# Patient Record
Sex: FEMALE | Race: WHITE | URBAN - METROPOLITAN AREA
[De-identification: names, ages, dates, MRNs, and addresses within clinical notes are randomized per-mention and may not be internally consistent; named-entity substitution may affect disease eponyms.]

---

## 2017-06-12 ENCOUNTER — OFFICE VISIT (OUTPATIENT)
Dept: FAMILY MEDICINE | Facility: CLINIC | Age: 42
End: 2017-06-12
Payer: COMMERCIAL

## 2017-06-12 VITALS
TEMPERATURE: 99 F | DIASTOLIC BLOOD PRESSURE: 79 MMHG | HEIGHT: 67 IN | OXYGEN SATURATION: 98 % | WEIGHT: 180.2 LBS | HEART RATE: 68 BPM | BODY MASS INDEX: 28.28 KG/M2 | SYSTOLIC BLOOD PRESSURE: 134 MMHG

## 2017-06-12 DIAGNOSIS — S93.602A FOOT SPRAIN, LEFT, INITIAL ENCOUNTER: Primary | ICD-10-CM

## 2017-06-12 PROCEDURE — 99203 OFFICE O/P NEW LOW 30 MIN: CPT | Performed by: PHYSICIAN ASSISTANT

## 2017-06-12 RX ORDER — DICLOFENAC SODIUM 75 MG/1
75 TABLET, DELAYED RELEASE ORAL 2 TIMES DAILY
Qty: 14 TABLET | Refills: 0 | Status: SHIPPED | OUTPATIENT
Start: 2017-06-12 | End: 2017-06-20 | Stop reason: ALTCHOICE

## 2017-06-12 NOTE — MR AVS SNAPSHOT
After Visit Summary   6/12/2017    Randi Gregory    MRN: [id number]           Patient Information     Date Of Birth          1975        Visit Information        Provider Department      6/12/2017 10:20 AM Nati Bañuelos PA-C [facility]        Today's Diagnoses     Foot sprain, left, initial encounter    -  1      Care Instructions    FYI: NATI WILL BE OUT OF THE CLINIC FROM AKILAH 15 THROUGH JULY 4.  Any calls/Mychart messages, refill requests, and urgent lab results will be forwarded to her colleagues in her absence.     You can combine Tylenol with diclofenac if you need something extra for pain.      Foot Sprain    A sprain is a stretching or tearing of the ligaments that hold a joint together. There are no broken bones. Sprains generally take from 3-6 weeks to heal. A sprain may be treated with a splint, walking cast, or special boot. Mild sprains may not need any additional support.  Home care  The following guidelines will help you care for your injury at home:    Keep your leg elevated when sitting or lying down. This is very important during the first 48 hours to reduce swelling. Stay off the injured foot as much as possible until you can walk on it without pain. If needed, you may use crutches during the first week for this purpose. Crutches can be rented at many pharmacies or surgical/orthopedic supply stores.    You may be given a cast shoe to wear to prevent movement in your foot. If not, you can use a sandal or any shoe that does not put pressure on the injured area until the swelling and pain go away. If using a sandal, be careful not to hit your foot against anything, since another injury could make the sprain worse.    Apply an ice pack over the injured area for 15 to 20 minutes every 3 to 6 hours. You should do this for the first 24 to 48 hours. You can make an ice pack by filling a plastic bag that seals at the top with ice cubes and then wrapping it with a  thin towel. Continue to use ice packs for relief of pain and swelling as needed. As the ice melts, avoid getting any wrap, splint, or cast wet. After 48 hours, apply heat from a warm shower or bath for 20 minutes several times daily. Alternating ice and heat may also be helpful.    You may use over-the-counter pain medicine to control pain, unless another medicine was prescribed. If you have chronic liver or kidney disease or ever had a stomach ulcer or GI bleeding, talk with your healthcare provider before using these medicines.    If you were given a splint or cast, keep it dry. Bathe with your splint or cast well out of the water, protected with 2 large plastic bags, rubber-banded at the top end. If a fiberglass splint or cast gets wet, you can dry it with a hair dryer.    You may return to sports after healing, when you can run without pain.  Follow-up care  Follow up with your healthcare provider as directed. Sometimes fractures don t show up on the first X-ray. Bruises and sprains can sometimes hurt as much as a fracture. These injuries can take time to heal completely. If your symptoms don t improve or they get worse, talk with your healthcare provider. You may need a repeat X-ray.  When to seek medical advice  Call your healthcare provider right away if any of these occur:    The plaster cast or splint gets wet or soft    The fiberglass cast or splint gets wet and does not dry for 24 hours    Pain or swelling increases, or redness appears    A bad odor comes from within the cast    Fever of 100.4 F (38 C) or above lasting for 24 to 48 hours    Toes on the injured foot become cold, blue, numb, or tingly    7128-0616 [employer]. [address] [place] [address]. All rights reserved. This information is not intended as a substitute for professional medical care. Always follow your healthcare professional's instructions.                Follow-ups after your visit        Who to contact      "Normal or non-critical lab and imaging results will be communicated to you by MyChart, letter or phone within 4 business days after the clinic has received the results. If you do not hear from us within 7 days, please contact the clinic through [employer] or phone. If you have a critical or abnormal lab result, we will notify you by phone as soon as possible.  Submit refill requests through [employer] or call your pharmacy and they will forward the refill request to us. Please allow 3 business days for your refill to be completed.          If you need to speak with a  for additional information , please call: [phone]             Additional Information About Your Visit        [employer] Information     [employer] lets you send messages to your doctor, view your test results, renew your prescriptions, schedule appointments and more. To sign up, go to [email or web address] . Click on \"Log in\" on the left side of the screen, which will take you to the Welcome page. Then click on \"Sign up Now\" on the right side of the page.     You will be asked to enter the access code listed below, as well as some personal information. Please follow the directions to create your username and password.     Your access code is: [email or web address]  Expires: 9/10/2017  2:26 PM     Your access code will  in 90 days. If you need help or a new code, please call your [facility] clinic or [phone].        Care EveryWhere ID     This is your Care EveryWhere ID. This could be used by other organizations to access your [facility] medical records  [id number]        Your Vitals Were     Pulse Temperature Height Pulse Oximetry BMI (Body Mass Index)       68 99  F (37.2  C) (Oral) 5' 7\" (1.702 m) 98% 28.22 kg/m2        Blood Pressure from Last 3 Encounters:   17 134/79    Weight from Last 3 Encounters:   17 180 lb 3.2 oz (81.7 kg)              Today, you had the following     No orders found for display         Today's " Medication Changes          These changes are accurate as of: 6/12/17  2:26 PM.  If you have any questions, ask your nurse or doctor.               Start taking these medicines.        Dose/Directions    diclofenac 75 MG EC tablet   Commonly known as:  VOLTAREN   Used for:  Foot sprain, left, initial encounter   Started by:  Radha Bañuelos PA-C        Dose:  75 mg   Take 1 tablet (75 mg) by mouth 2 times daily for 7 days   Quantity:  14 tablet   Refills:  0            Where to get your medicines      These medications were sent to [id number] [place] - [address]  [address] [place] [address]     Phone:  [phone]     diclofenac 75 MG EC tablet                Primary Care Provider Office Phone #    Damon [facility]ins [facility] [phone]       No address on file        Thank you!     Thank you for choosing [facility] your care. Our goal is always to provide you with excellent care. Hearing back from our patients is one way we can continue to improve our services. Please take a few minutes to complete the written survey that you may receive in the mail after your visit with us. Thank you!             Your Updated Medication List - Protect others around you: Learn how to safely use, store and throw away your medicines at www.disposemymeds.org.          This list is accurate as of: 6/12/17  2:26 PM.  Always use your most recent med list.                   Brand Name Dispense Instructions for use    diclofenac 75 MG EC tablet    VOLTAREN    14 tablet    Take 1 tablet (75 mg) by mouth 2 times daily for 7 days

## 2017-06-12 NOTE — PROGRESS NOTES
"  SUBJECTIVE:                                                    Randi Gregory is a 41 year old female who presents to clinic today for the following health issues:    Joint Pain     Onset: x1 day     Description:   Location: left foot - top   Character: soreness     Intensity: 10/10    Progression of Symptoms: worse    Accompanying Signs & Symptoms:  Other symptoms: none   History:   Previous similar pain: no       Precipitating factors:   Trauma or overuse: no on feet a lot not sure of anything specific     Alleviating factors:  Improved by: not walking        Therapies Tried and outcome: ibuprofen 800mg - no relief      Problem list and histories reviewed & adjusted, as indicated.  Additional history: as documented    There is no problem list on file for this patient.    History reviewed. No pertinent surgical history.    Social History   Substance Use Topics     Smoking status: Never Smoker     Smokeless tobacco: Never Used     Alcohol use No     History reviewed. No pertinent family history.        Reviewed and updated as needed this visit by clinical staff  Tobacco  Allergies  Meds  Med Hx  Surg Hx  Fam Hx  Soc Hx      Reviewed and updated as needed this visit by Provider         ROS:  Constitutional, neuro, musculoskeletal, integument systems are negative, except as otherwise noted.    OBJECTIVE:                                                    /79  Pulse 68  Temp 99  F (37.2  C) (Oral)  Ht 5' 7\" (1.702 m)  Wt 180 lb 3.2 oz (81.7 kg)  SpO2 98%  BMI 28.22 kg/m2  Body mass index is 28.22 kg/(m^2).  GENERAL APPEARANCE: healthy, alert and no distress  MS: extremities normal- no gross deformities noted  ORTHO: Foot Exam: Inspection:  no swelling   Tender::2nd, 3rd metatarsal  Range of Motion:flexion of toes:  full, extension of toes  full    NEURO: Normal strength and tone and sensory exam grossly normal       ASSESSMENT:                                                      1. Foot sprain, " left, initial encounter         PLAN:                                                    Diclofenac BID x7 days. Work restrictions given, limit walking. Supportive therapy also discussed. Follow up if symptoms fail to improve or worsen.      Patient Instructions   FYI: NATI WILL BE OUT OF THE CLINIC FROM AKILAH 15 THROUGH JULY 4.  Any calls/Mychart messages, refill requests, and urgent lab results will be forwarded to her colleagues in her absence.     You can combine Tylenol with diclofenac if you need something extra for pain.      Foot Sprain    A sprain is a stretching or tearing of the ligaments that hold a joint together. There are no broken bones. Sprains generally take from 3-6 weeks to heal. A sprain may be treated with a splint, walking cast, or special boot. Mild sprains may not need any additional support.  Home care  The following guidelines will help you care for your injury at home:    Keep your leg elevated when sitting or lying down. This is very important during the first 48 hours to reduce swelling. Stay off the injured foot as much as possible until you can walk on it without pain. If needed, you may use crutches during the first week for this purpose. Crutches can be rented at many pharmacies or surgical/orthopedic supply stores.    You may be given a cast shoe to wear to prevent movement in your foot. If not, you can use a sandal or any shoe that does not put pressure on the injured area until the swelling and pain go away. If using a sandal, be careful not to hit your foot against anything, since another injury could make the sprain worse.    Apply an ice pack over the injured area for 15 to 20 minutes every 3 to 6 hours. You should do this for the first 24 to 48 hours. You can make an ice pack by filling a plastic bag that seals at the top with ice cubes and then wrapping it with a thin towel. Continue to use ice packs for relief of pain and swelling as needed. As the ice melts, avoid getting  any wrap, splint, or cast wet. After 48 hours, apply heat from a warm shower or bath for 20 minutes several times daily. Alternating ice and heat may also be helpful.    You may use over-the-counter pain medicine to control pain, unless another medicine was prescribed. If you have chronic liver or kidney disease or ever had a stomach ulcer or GI bleeding, talk with your healthcare provider before using these medicines.    If you were given a splint or cast, keep it dry. Bathe with your splint or cast well out of the water, protected with 2 large plastic bags, rubber-banded at the top end. If a fiberglass splint or cast gets wet, you can dry it with a hair dryer.    You may return to sports after healing, when you can run without pain.  Follow-up care  Follow up with your healthcare provider as directed. Sometimes fractures don t show up on the first X-ray. Bruises and sprains can sometimes hurt as much as a fracture. These injuries can take time to heal completely. If your symptoms don t improve or they get worse, talk with your healthcare provider. You may need a repeat X-ray.  When to seek medical advice  Call your healthcare provider right away if any of these occur:    The plaster cast or splint gets wet or soft    The fiberglass cast or splint gets wet and does not dry for 24 hours    Pain or swelling increases, or redness appears    A bad odor comes from within the cast    Fever of 100.4 F (38 C) or above lasting for 24 to 48 hours    Toes on the injured foot become cold, blue, numb, or tingly    0267-1880 The "[employer] [address] [place] [address], [place] [address]. All rights reserved. This information is not intended as a substitute for professional medical care. Always follow your healthcare professional's instructions.             Radha Bañuelos PA-C  [facility]

## 2017-06-12 NOTE — NURSING NOTE
"Chief Complaint   Patient presents with     Foot Problems       Initial /79  Pulse 68  Temp 99  F (37.2  C) (Oral)  Ht 5' 7\" (1.702 m)  Wt 180 lb 3.2 oz (81.7 kg)  SpO2 98%  BMI 28.22 kg/m2 Estimated body mass index is 28.22 kg/(m^2) as calculated from the following:    Height as of this encounter: 5' 7\" (1.702 m).    Weight as of this encounter: 180 lb 3.2 oz (81.7 kg).  Medication Reconciliation: complete   Lizbeth Beasley MA      "

## 2017-06-12 NOTE — PATIENT INSTRUCTIONS
KOREYI: NATI WILL BE OUT OF THE CLINIC FROM AKILAH 15 THROUGH JULY 4.  Any calls/Mychart messages, refill requests, and urgent lab results will be forwarded to her colleagues in her absence.     You can combine Tylenol with diclofenac if you need something extra for pain.      Foot Sprain    A sprain is a stretching or tearing of the ligaments that hold a joint together. There are no broken bones. Sprains generally take from 3-6 weeks to heal. A sprain may be treated with a splint, walking cast, or special boot. Mild sprains may not need any additional support.  Home care  The following guidelines will help you care for your injury at home:    Keep your leg elevated when sitting or lying down. This is very important during the first 48 hours to reduce swelling. Stay off the injured foot as much as possible until you can walk on it without pain. If needed, you may use crutches during the first week for this purpose. Crutches can be rented at many pharmacies or surgical/orthopedic supply stores.    You may be given a cast shoe to wear to prevent movement in your foot. If not, you can use a sandal or any shoe that does not put pressure on the injured area until the swelling and pain go away. If using a sandal, be careful not to hit your foot against anything, since another injury could make the sprain worse.    Apply an ice pack over the injured area for 15 to 20 minutes every 3 to 6 hours. You should do this for the first 24 to 48 hours. You can make an ice pack by filling a plastic bag that seals at the top with ice cubes and then wrapping it with a thin towel. Continue to use ice packs for relief of pain and swelling as needed. As the ice melts, avoid getting any wrap, splint, or cast wet. After 48 hours, apply heat from a warm shower or bath for 20 minutes several times daily. Alternating ice and heat may also be helpful.    You may use over-the-counter pain medicine to control pain, unless another medicine was  prescribed. If you have chronic liver or kidney disease or ever had a stomach ulcer or GI bleeding, talk with your healthcare provider before using these medicines.    If you were given a splint or cast, keep it dry. Bathe with your splint or cast well out of the water, protected with 2 large plastic bags, rubber-banded at the top end. If a fiberglass splint or cast gets wet, you can dry it with a hair dryer.    You may return to sports after healing, when you can run without pain.  Follow-up care  Follow up with your healthcare provider as directed. Sometimes fractures don t show up on the first X-ray. Bruises and sprains can sometimes hurt as much as a fracture. These injuries can take time to heal completely. If your symptoms don t improve or they get worse, talk with your healthcare provider. You may need a repeat X-ray.  When to seek medical advice  Call your healthcare provider right away if any of these occur:    The plaster cast or splint gets wet or soft    The fiberglass cast or splint gets wet and does not dry for 24 hours    Pain or swelling increases, or redness appears    A bad odor comes from within the cast    Fever of 100.4 F (38 C) or above lasting for 24 to 48 hours    Toes on the injured foot become cold, blue, numb, or tingly    8349-8139 [employer]. [address] [place] [address], [place] [address]. All rights reserved. This information is not intended as a substitute for professional medical care. Always follow your healthcare professional's instructions.

## 2017-06-20 ENCOUNTER — RADIANT APPOINTMENT (OUTPATIENT)
Dept: GENERAL RADIOLOGY | Facility: CLINIC | Age: 42
End: 2017-06-20
Attending: FAMILY MEDICINE
Payer: COMMERCIAL

## 2017-06-20 ENCOUNTER — OFFICE VISIT (OUTPATIENT)
Dept: FAMILY MEDICINE | Facility: CLINIC | Age: 42
End: 2017-06-20
Payer: COMMERCIAL

## 2017-06-20 VITALS
HEART RATE: 74 BPM | DIASTOLIC BLOOD PRESSURE: 75 MMHG | SYSTOLIC BLOOD PRESSURE: 120 MMHG | BODY MASS INDEX: 28.66 KG/M2 | HEIGHT: 67 IN | WEIGHT: 182.6 LBS | OXYGEN SATURATION: 98 % | TEMPERATURE: 98.5 F

## 2017-06-20 DIAGNOSIS — R03.0 ELEVATED BLOOD PRESSURE READING WITHOUT DIAGNOSIS OF HYPERTENSION: ICD-10-CM

## 2017-06-20 DIAGNOSIS — M79.672 LEFT FOOT PAIN: Primary | ICD-10-CM

## 2017-06-20 PROCEDURE — 73630 X-RAY EXAM OF FOOT: CPT | Mod: LT

## 2017-06-20 PROCEDURE — 99213 OFFICE O/P EST LOW 20 MIN: CPT | Performed by: FAMILY MEDICINE

## 2017-06-20 RX ORDER — ETODOLAC 300 MG
300 CAPSULE ORAL
Qty: 30 CAPSULE | Refills: 0 | Status: SHIPPED | OUTPATIENT
Start: 2017-06-20

## 2017-06-20 NOTE — NURSING NOTE
"Chief Complaint   Patient presents with     Musculoskeletal Problem       Initial /81  Pulse 74  Temp 98.5  F (36.9  C) (Tympanic)  Ht 5' 7\" (1.702 m)  Wt 182 lb 9.6 oz (82.8 kg)  SpO2 98%  Breastfeeding? No  BMI 28.6 kg/m2 Estimated body mass index is 28.6 kg/(m^2) as calculated from the following:    Height as of this encounter: 5' 7\" (1.702 m).    Weight as of this encounter: 182 lb 9.6 oz (82.8 kg).  Medication Reconciliation: complete       Marta Xiao MA      "

## 2017-06-20 NOTE — PATIENT INSTRUCTIONS
Will notify you with results  R.I.C.E.    R.I.C.E. stands for Rest, Ice, Compression, and Elevation. Doing these things helps limit pain and swelling after an injury. R.I.C.E. also helps injuries heal faster. Use R.I.C.E. for sprains, strains, and severe bruises or bumps. Follow the tips on this handout and begin R.I.C.E. as soon as possible after an injury.  ? Rest  Pain is your body s way of telling you to rest an injured area. Whether you have hurt an elbow, hand, foot, or knee, limiting its use will prevent further injury and help you heal.  ? Ice  Applying ice right after an injury helps prevent swelling and reduce pain. Don t place ice directly on your skin.    Wrap a cold pack or bag of ice in a thin cloth. Place it over the injured area.    Ice for 10 minutes every 3 hours. Don t ice for more than 20 minutes at a time.  ? Compression  Putting pressure (compression) on an injury helps prevent swelling and provides support.    Wrap the injured area firmly with an elastic bandage. If your hand or foot tingles, becomes discolored, or feels cold to the touch, the bandage may be too tight. Rewrap it more loosely.    If your bandage becomes too loose, rewrap it.    Do not wear an elastic bandage overnight.  ? Elevation  Keeping an injury elevated helps reduce swelling, pain, and throbbing. Elevation is most effective when the injury is kept elevated higher than the heart.     Call your healthcare provider if you notice any of the following:    Fingers or toes feel numb, are cold to the touch, or change color    Skin looks shiny or tight    Pain, swelling, or bruising worsens and is not improved with elevation   Date Last Reviewed: 9/3/2015    5413-4589 [employer]. [address] [place] [address], [place] [address]. All rights reserved. This information is not intended as a substitute for professional medical care. Always follow your healthcare professional's instructions.

## 2017-06-20 NOTE — MR AVS SNAPSHOT
"              After Visit Summary   6/20/2017    Randi Gregory    MRN: [id number]           Patient Information     Date Of Birth          1975        Visit Information        Provider Department      6/20/2017 3:30 PM Sarai Alexander MD [facility] [place]        Today's Diagnoses     Left foot pain    -  1    Elevated blood pressure reading without diagnosis of hypertension          Care Instructions    Will notify you with results          Follow-ups after your visit        Follow-up notes from your care team     Return if symptoms worsen or fail to improve.      Your next 10 appointments already scheduled     Jun 28, 2017  9:30 AM CDT   PHYSICAL with Sarai Alexander MD   [facility] [place] ([facility])    [address]  [place] [address] [phone]              Who to contact     Normal or non-critical lab and imaging results will be communicated to you by MyChart, letter or phone within 4 business days after the clinic has received the results. If you do not hear from us within 7 days, please contact the clinic through MyChart or phone. If you have a critical or abnormal lab result, we will notify you by phone as soon as possible.  Submit refill requests through [employer] or call your pharmacy and they will forward the refill request to us. Please allow 3 business days for your refill to be completed.          If you need to speak with a  for additional information , please call: [phone]             Additional Information About Your Visit        GrownOut Information     [employer] lets you send messages to your doctor, view your test results, renew your prescriptions, schedule appointments and more. To sign up, go to [email or web address] . Click on \"Log in\" on the left side of the screen, which will take you to the Welcome page. Then click on \"Sign up Now\" on the right side of the page.     You will be asked to enter the access code " "listed below, as well as some personal information. Please follow the directions to create your username and password.     Your access code is: [email or web address]  Expires: 9/10/2017  2:26 PM     Your access code will  in 90 days. If you need help or a new code, please call your [facility] or [phone].        Care EveryWhere ID     This is your Care EveryWhere ID. This could be used by other organizations to access your [place] medical records  [id number]        Your Vitals Were     Pulse Temperature Height Pulse Oximetry Breastfeeding? BMI (Body Mass Index)    74 98.5  F (36.9  C) (Tympanic) 5' 7\" (1.702 m) 98% No 28.6 kg/m2       Blood Pressure from Last 3 Encounters:   17 143/81   17 134/79    Weight from Last 3 Encounters:   17 182 lb 9.6 oz (82.8 kg)   17 180 lb 3.2 oz (81.7 kg)              We Performed the Following     XR Foot Left G/E 3 Views          Today's Medication Changes          These changes are accurate as of: 17  4:04 PM.  If you have any questions, ask your nurse or doctor.               Start taking these medicines.        Dose/Directions    etodolac 300 MG capsule   Commonly known as:  LODINE   Used for:  Left foot pain   Started by:  Sarai Alexander MD        Dose:  300 mg   Take 1 capsule (300 mg) by mouth 3 times daily (with meals)   Quantity:  30 capsule   Refills:  0         Stop taking these medicines if you haven't already. Please contact your care team if you have questions.     diclofenac 75 MG EC tablet   Commonly known as:  VOLTAREN   Stopped by:  Sarai Alexander MD                Where to get your medicines      These medications were sent to [id number] [place] - [address]  [address] [place] [address]     Phone:  [phone]     etodolac 300 MG capsule                Primary Care Provider Office Phone #    [facility] [phone]       No address on file        Thank you!     Thank you for " [facility]  [facility] your [facility]. Our goal is always to provide you with excellent care. Hearing back from our patients is one way we can continue to improve our services. Please take a few minutes to complete the written survey that you may receive in the mail after your visit with us. Thank you!             Your Updated Medication List - Protect others around you: Learn how to safely use, store and throw away your medicines at www.disposemymeds.org.          This list is accurate as of: 6/20/17  4:04 PM.  Always use your most recent med list.                   Brand Name Dispense Instructions for use    etodolac 300 MG capsule    LODINE    30 capsule    Take 1 capsule (300 mg) by mouth 3 times daily (with meals)

## 2017-06-20 NOTE — PROGRESS NOTES
"  SUBJECTIVE:                                                    Randi Gregory is a 41 year old female who presents to clinic today for the following health issues:      Foot Pain- Left/ Last OV 6/12/17  Completed Voltaren as directed, no improvements of symptoms.   Has 2 part time jobs, she is on her feet the entire shift.  May need to discuss restrictions at work- discuss letter for work.     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 Pain is located on the anterior aspect of the foot, rates it as 10/10, not relieved with the BID Voltaren.   Denies any recent direct injury/trauma to the foot.   No previous known history of stress fracture.    Problem list and histories reviewed & adjusted, as indicated.  Additional history: as documented    Current Outpatient Prescriptions   Medication Sig Dispense Refill     etodolac (LODINE) 300 MG capsule Take 1 capsule (300 mg) by mouth 3 times daily (with meals) 30 capsule 0     No Known Allergies    Reviewed and updated as needed this visit by clinical staff       Reviewed and updated as needed this visit by Provider         ROS:  Constitutional, HEENT, cardiovascular, pulmonary, gi and gu systems are negative, except as otherwise noted.    OBJECTIVE:                                                    /75  Pulse 74  Temp 98.5  F (36.9  C) (Tympanic)  Ht 5' 7\" (1.702 m)  Wt 182 lb 9.6 oz (82.8 kg)  SpO2 98%  Breastfeeding? No  BMI 28.6 kg/m2  Body mass index is 28.6 kg/(m^2).  GENERAL: healthy, alert and no distress  LEFT FOOT: Normal appearing. No cyanosis. Pedal pulse present. Tenderness over the anterior aspect of the foot but no overlying skin changes.  GAIT: cautious gait.     Diagnostic Test Results:  Xray - in process; will notify patient with results.      ASSESSMENT/PLAN:                                                    Randi was seen today for musculoskeletal problem.    Diagnoses and all orders for this visit:    Left foot " pain  -     XR Foot Left G/E 3 Views  -    Discontinue Voltaren. Try: etodolac (LODINE) 300 MG capsule; Take 1 capsule (300 mg) by mouth 3 times daily (with meals)  In the interim; recommended R.I.C.E therapy; shoe inserts and good sturdy shoes.     Elevated blood pressure reading without diagnosis of hypertension  Repeat BP at the end of the visit was within goal.    Will notify patient with X-ray results     Patient education and Handout with home care instructions given.       Follow up if symptoms fail to improve or worsen.      The patient was in agreement with the plan today and had no questions or concerns prior to leaving the clinic.      Sarai Alexander MD  [facility]

## 2017-06-22 ENCOUNTER — TELEPHONE (OUTPATIENT)
Dept: FAMILY MEDICINE | Facility: CLINIC | Age: 42
End: 2017-06-22

## 2017-06-22 NOTE — LETTER
June 22, 2017      Randi Gregory  [address]  [place] [address]              TO WHOM IT MAY CONCERN      Patient was seen in the clinic on 6/20/2017.       Sincerely,        Sarai Alexander M.D    [facility]

## 2017-06-23 ENCOUNTER — OFFICE VISIT (OUTPATIENT)
Dept: ORTHOPEDICS | Facility: CLINIC | Age: 42
End: 2017-06-23
Payer: COMMERCIAL

## 2017-06-23 VITALS
BODY MASS INDEX: 28.56 KG/M2 | HEIGHT: 67 IN | SYSTOLIC BLOOD PRESSURE: 112 MMHG | DIASTOLIC BLOOD PRESSURE: 70 MMHG | WEIGHT: 182 LBS

## 2017-06-23 DIAGNOSIS — M77.42 METATARSALGIA OF LEFT FOOT: Primary | ICD-10-CM

## 2017-06-23 DIAGNOSIS — S99.922D FOOT INJURY, LEFT, SUBSEQUENT ENCOUNTER: ICD-10-CM

## 2017-06-23 PROCEDURE — 99203 OFFICE O/P NEW LOW 30 MIN: CPT | Performed by: FAMILY MEDICINE

## 2017-06-23 NOTE — NURSING NOTE
"Chief Complaint   Patient presents with     Foot Pain     left       Initial /70  Ht 5' 7\" (1.702 m)  Wt 182 lb (82.6 kg)  BMI 28.51 kg/m2 Estimated body mass index is 28.51 kg/(m^2) as calculated from the following:    Height as of this encounter: 5' 7\" (1.702 m).    Weight as of this encounter: 182 lb (82.6 kg).  Medication Reconciliation: complete    "

## 2017-06-23 NOTE — PROGRESS NOTES
"Randi Gregory  :  1975  DOS: 2017  MRN: [id number]    Sports Medicine Clinic Visit    PCP: Damon Montero    Randi Gregory is a 41 year old female who is seen as an AIC patient presenting with left superior foot pain localized to the 2nd and 3rd metatarsals    Injury: Was walking in the store and felt pain beginning 1 week(s) ago.  Pain located over left 2nd and 3rd metatrasal, nonradiating.  Additional Features:  Positive: swelling, walks with antalgic gait.  Symptoms are better with Other medications: etodolac, Rest and Elevation.  Symptoms are worse with: walking.  Other evaluation and/or treatments so far consists of: Ice, Other medications: etodolac, Elevation and epsom salt bath.  Prior imaging: X-rays completed 17.   Prior History of related problems:none    Social History: [employer] , customer service    Review of Systems  Musculoskeletal: as above  Remainder of review of systems is negative including constitutional, CV, pulmonary, GI, Skin and Neurologic except as noted in HPI or medical history.    No past medical history on file.  No past surgical history on file.  Objective  /70  Ht 5' 7\" (1.702 m)  Wt 182 lb (82.6 kg)  BMI 28.51 kg/m2    General: healthy, alert and in no distress    HEENT: no scleral icterus or conjunctival erythema   Skin: no suspicious lesions or rash. No jaundice.   CV: regular rhythm by palpation, 2+ distal pulses, no pedal edema    Resp: normal respiratory effort without conversational dyspnea   Psych: normal mood and affect    Gait: mildly antalgic, appropriate coordination and balance   Neuro: normal light touch sensory exam of the extremities. Motor strength as noted below     Left Ankle/Foot Exam:    Inspection:       no visible ecchymosis       no visible edema or effusion    Foot inspection:       no deformity noted    ROM:        full ROM with dorsiflexion, plantarflexion, inversion and eversion    Tender:       proximal " "1st and 2nd intermetatarsal ligament       2nd and 3rd MT    Non-Tender:       remainder of foot and ankle       lateral malleolus       lateral ankle ligaments       deltoid ligament       posterior edge of lateral malleolus       proximal 5th metatarsal       dorsal tibiotalar joint       tarsal navicular       medial malleolus       distal tibiofibular joint       tibialis posterior tendon, posterior to medial malleolus       peroneal tendon sheath    Skin:       well perfused       capillary refill less than 2 seconds    Special Tests:       neg (-) talar tilt left    Gait:       increased pain with attempt at rising on toes    Proprioception:        deferred      Radiology:  Recent Results (from the past 744 hour(s))   XR Foot Left G/E 3 Views    Narrative    XR FOOT LT G/E 3 VW 6/20/2017 4:21 PM    HISTORY: Left foot pain.    COMPARISON: None.    FINDINGS: No fracture or malalignment. No significant degenerative  change. Osseous structures are within normal limits.      Impression    IMPRESSION: No specific finding to explain pain.    STEFAN GARCÍA MD         Assessment:  1. Metatarsalgia of left foot    2. Foot injury, left, subsequent encounter        Plan:  Discussed the assessment with the patient.  Follow up: 2 weeks, prn  Overuse type injury suspected  Able to bear weight with less pain in dynaflex insert, but much better in walking boot  Will treat for possible stress injury though no acute findings on XR  XR images independently visualized and reviewed with patient today in clinic  Wean boot as tolerated  If painfree in 2 weeks can return to work without restriction  Letter provided today:  \"I am recommending that she strictly avoid painful activity over the next 2 weeks.  I anticipate standing and walking will worsen her pain, and so light duty desk work is most appropriate over the next 2 weeks.  She may be using a walking boot and/or cane during that time.  Please help to accommodate.  Updated " "recommendations will be provided as needed.\"  Assistive device use reviewed  Prefer tylenol to advil if stress injury  We discussed modified progressive pain-free activity as tolerated  Home handouts provided and supportive care reviewed  All questions were answered today  Contact us with additional questions or concerns  Signs and sx of concern reviewed      Mac Martin DO, CAJOHANA  Primary Care Sports Medicine  [facility]                 "

## 2017-06-23 NOTE — LETTER
June 23, 2017      Randi Gregory  [address]  [place] [address]        Randi was seen in my office today.  I am recommending that she strictly avoid painful activity over the next 2 weeks.  I anticipate standing and walking will worsen her pain, and so light duty desk work is most appropriate over the next 2 weeks.  She may be using a walking boot and/or cane during that time.  Please help to accommodate.  Updated recommendations will be provided as needed.        Mac Freire DO, CAQ  Primary Care Sports Medicine  [facility]

## 2017-06-27 ENCOUNTER — TELEPHONE (OUTPATIENT)
Dept: ORTHOPEDICS | Facility: CLINIC | Age: 42
End: 2017-06-27

## 2017-06-27 NOTE — TELEPHONE ENCOUNTER
Call made to patient in follow up to AIC Visit.  LVM for return call.      I m calling from [facility]  in follow up to your recent visit on 6/23.  Please return our call at [phone] option [phone]. If we do not hear back from you we trust you are doing well.
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
## 2017-07-05 ENCOUNTER — OFFICE VISIT (OUTPATIENT)
Dept: FAMILY MEDICINE | Facility: CLINIC | Age: 42
End: 2017-07-05
Payer: COMMERCIAL

## 2017-07-05 VITALS
SYSTOLIC BLOOD PRESSURE: 132 MMHG | BODY MASS INDEX: 28.41 KG/M2 | WEIGHT: 181 LBS | DIASTOLIC BLOOD PRESSURE: 89 MMHG | HEIGHT: 67 IN | OXYGEN SATURATION: 98 % | HEART RATE: 68 BPM | TEMPERATURE: 97.9 F

## 2017-07-05 DIAGNOSIS — Z13.1 SCREENING FOR DIABETES MELLITUS: ICD-10-CM

## 2017-07-05 DIAGNOSIS — Z12.4 SCREENING FOR CERVICAL CANCER: ICD-10-CM

## 2017-07-05 DIAGNOSIS — F41.8 DEPRESSION WITH ANXIETY: ICD-10-CM

## 2017-07-05 DIAGNOSIS — Z00.00 ROUTINE GENERAL MEDICAL EXAMINATION AT A HEALTH CARE FACILITY: Primary | ICD-10-CM

## 2017-07-05 DIAGNOSIS — Z13.220 LIPID SCREENING: ICD-10-CM

## 2017-07-05 DIAGNOSIS — Z12.31 ENCOUNTER FOR SCREENING MAMMOGRAM FOR BREAST CANCER: ICD-10-CM

## 2017-07-05 LAB
ALBUMIN SERPL-MCNC: 3.6 G/DL (ref 3.4–5)
ALP SERPL-CCNC: 106 U/L (ref 40–150)
ALT SERPL W P-5'-P-CCNC: 19 U/L (ref 0–50)
ANION GAP SERPL CALCULATED.3IONS-SCNC: 7 MMOL/L (ref 3–14)
AST SERPL W P-5'-P-CCNC: 13 U/L (ref 0–45)
BILIRUB SERPL-MCNC: 0.3 MG/DL (ref 0.2–1.3)
BUN SERPL-MCNC: 17 MG/DL (ref 7–30)
CALCIUM SERPL-MCNC: 8.9 MG/DL (ref 8.5–10.1)
CHLORIDE SERPL-SCNC: 105 MMOL/L (ref 94–109)
CHOLEST SERPL-MCNC: 182 MG/DL
CO2 SERPL-SCNC: 26 MMOL/L (ref 20–32)
CREAT SERPL-MCNC: 0.64 MG/DL (ref 0.52–1.04)
ERYTHROCYTE [DISTWIDTH] IN BLOOD BY AUTOMATED COUNT: 12.9 % (ref 10–15)
GFR SERPL CREATININE-BSD FRML MDRD: NORMAL ML/MIN/1.7M2
GLUCOSE SERPL-MCNC: 89 MG/DL (ref 70–99)
HCT VFR BLD AUTO: 39.9 % (ref 35–47)
HDLC SERPL-MCNC: 44 MG/DL
HGB BLD-MCNC: 12.5 G/DL (ref 11.7–15.7)
LDLC SERPL CALC-MCNC: 124 MG/DL
MCH RBC QN AUTO: 29.7 PG (ref 26.5–33)
MCHC RBC AUTO-ENTMCNC: 31.3 G/DL (ref 31.5–36.5)
MCV RBC AUTO: 95 FL (ref 78–100)
NONHDLC SERPL-MCNC: 138 MG/DL
PLATELET # BLD AUTO: 262 10E9/L (ref 150–450)
POTASSIUM SERPL-SCNC: 4.5 MMOL/L (ref 3.4–5.3)
PROT SERPL-MCNC: 7.4 G/DL (ref 6.8–8.8)
RBC # BLD AUTO: 4.21 10E12/L (ref 3.8–5.2)
SODIUM SERPL-SCNC: 138 MMOL/L (ref 133–144)
TRIGL SERPL-MCNC: 68 MG/DL
TSH SERPL DL<=0.005 MIU/L-ACNC: 1.05 MU/L (ref 0.4–4)
VIT B12 SERPL-MCNC: 410 PG/ML (ref 193–986)
WBC # BLD AUTO: 7 10E9/L (ref 4–11)

## 2017-07-05 PROCEDURE — G0123 SCREEN CERV/VAG THIN LAYER: HCPCS | Performed by: FAMILY MEDICINE

## 2017-07-05 PROCEDURE — 99213 OFFICE O/P EST LOW 20 MIN: CPT | Mod: 25 | Performed by: FAMILY MEDICINE

## 2017-07-05 PROCEDURE — 90471 IMMUNIZATION ADMIN: CPT | Performed by: FAMILY MEDICINE

## 2017-07-05 PROCEDURE — 80053 COMPREHEN METABOLIC PANEL: CPT | Performed by: FAMILY MEDICINE

## 2017-07-05 PROCEDURE — 82306 VITAMIN D 25 HYDROXY: CPT | Performed by: FAMILY MEDICINE

## 2017-07-05 PROCEDURE — 87624 HPV HI-RISK TYP POOLED RSLT: CPT | Performed by: FAMILY MEDICINE

## 2017-07-05 PROCEDURE — 80061 LIPID PANEL: CPT | Performed by: FAMILY MEDICINE

## 2017-07-05 PROCEDURE — 90715 TDAP VACCINE 7 YRS/> IM: CPT | Performed by: FAMILY MEDICINE

## 2017-07-05 PROCEDURE — 99396 PREV VISIT EST AGE 40-64: CPT | Mod: 25 | Performed by: FAMILY MEDICINE

## 2017-07-05 PROCEDURE — 36415 COLL VENOUS BLD VENIPUNCTURE: CPT | Performed by: FAMILY MEDICINE

## 2017-07-05 PROCEDURE — 85027 COMPLETE CBC AUTOMATED: CPT | Performed by: FAMILY MEDICINE

## 2017-07-05 PROCEDURE — 84443 ASSAY THYROID STIM HORMONE: CPT | Performed by: FAMILY MEDICINE

## 2017-07-05 PROCEDURE — 82607 VITAMIN B-12: CPT | Performed by: FAMILY MEDICINE

## 2017-07-05 RX ORDER — ESCITALOPRAM OXALATE 10 MG/1
10 TABLET ORAL DAILY
Qty: 30 TABLET | Refills: 1 | Status: SHIPPED | OUTPATIENT
Start: 2017-07-05

## 2017-07-05 ASSESSMENT — ANXIETY QUESTIONNAIRES
1. FEELING NERVOUS, ANXIOUS, OR ON EDGE: NOT AT ALL
5. BEING SO RESTLESS THAT IT IS HARD TO SIT STILL: NOT AT ALL
6. BECOMING EASILY ANNOYED OR IRRITABLE: NEARLY EVERY DAY
2. NOT BEING ABLE TO STOP OR CONTROL WORRYING: NEARLY EVERY DAY
IF YOU CHECKED OFF ANY PROBLEMS ON THIS QUESTIONNAIRE, HOW DIFFICULT HAVE THESE PROBLEMS MADE IT FOR YOU TO DO YOUR WORK, TAKE CARE OF THINGS AT HOME, OR GET ALONG WITH OTHER PEOPLE: VERY DIFFICULT
3. WORRYING TOO MUCH ABOUT DIFFERENT THINGS: NEARLY EVERY DAY
7. FEELING AFRAID AS IF SOMETHING AWFUL MIGHT HAPPEN: MORE THAN HALF THE DAYS
GAD7 TOTAL SCORE: 13

## 2017-07-05 ASSESSMENT — PATIENT HEALTH QUESTIONNAIRE - PHQ9: 5. POOR APPETITE OR OVEREATING: MORE THAN HALF THE DAYS

## 2017-07-05 NOTE — MR AVS SNAPSHOT
After Visit Summary   7/5/2017    Randi Gregory    MRN: [id number]           Patient Information     Date Of Birth          1975        Visit Information        Provider Department      7/5/2017 9:00 AM Sarai Alexander MD [facility] [place]        Today's Diagnoses     Routine general medical examination at a health care facility    -  1    Lipid screening        Screening for diabetes mellitus        Screening for cervical cancer        Depression with anxiety        Encounter for screening mammogram for breast cancer          Care Instructions      Preventive Health Recommendations  Female Ages 40 to 49    Yearly exam:     See your health care provider every year in order to  1. Review health changes.   2. Discuss preventive care.    3. Review your medicines if your doctor prescribed any.      Get a Pap test every three years (unless you have an abnormal result and your provider advises testing more often).      If you get Pap tests with HPV test, you only need to test every 5 years, unless you have an abnormal result. You do not need a Pap test if your uterus was removed (hysterectomy) and you have not had cancer.      You should be tested each year for STDs (sexually transmitted diseases), if you're at risk.       Ask your doctor if you should have a mammogram.      Have a colonoscopy (test for colon cancer) if someone in your family has had colon cancer or polyps before age 50.       Have a cholesterol test every 5 years.       Have a diabetes test (fasting glucose) after age 45. If you are at risk for diabetes, you should have this test every 3 years.    Shots: Get a flu shot each year. Get a tetanus shot every 10 years.     Nutrition:     Eat at least 5 servings of fruits and vegetables each day.    Eat whole-grain bread, whole-wheat pasta and brown rice instead of white grains and rice.    Talk to your provider about Calcium and Vitamin D.     Lifestyle    Exercise at least 150  minutes a week (an average of 30 minutes a day, 5 days a week). This will help you control your weight and prevent disease.    Limit alcohol to one drink per day.    No smoking.     Wear sunscreen to prevent skin cancer.    See your dentist every six months for an exam and cleaning.          Follow-ups after your visit        Additional Services     MENTAL HEALTH REFERRAL       Your provider has referred you to: [facility]: [facility] - Counseling (Individual/Couples/Family) - [facility] [place] [phone]   [email or web address]   *Patient will be contacted by [facility] scheduling partner, [facility]), to schedule an appointment.  Patients may also call [facility] to schedule.    All scheduling is subject to the client's specific insurance plan & benefits, provider/location availability, and provider clinical specialities.  Please arrive 15 minutes early for your first appointment and bring your completed paperwork.    Please be aware that coverage of these services is subject to the terms and limitations of your health insurance plan.  Call member services at your health plan with any benefit or coverage questions.                  Follow-up notes from your care team     Return in about 1 month (around 8/5/2017) for Follow up.      Your next 10 appointments already scheduled     Jul 07, 2017  4:20 PM CDT   Return Visit with Mac Martin,    [facility] [place] ([facility] [place])    [address]  [place] [address] [phone]              Future tests that were ordered for you today     Open Future Orders        Priority Expected Expires Ordered    MA Screening Digital Bilateral Routine  7/6/2018 7/5/2017            Who to contact     Normal or non-critical lab and imaging results will be communicated to you by MyChart, letter or phone within 4 business  "days after the clinic has received the results. If you do not hear from us within 7 days, please contact the clinic through [employer] or phone. If you have a critical or abnormal lab result, we will notify you by phone as soon as possible.  Submit refill requests through [employer] or call your pharmacy and they will forward the refill request to us. Please allow 3 business days for your refill to be completed.          If you need to speak with a  for additional information , please call: [phone]             Additional Information About Your Visit        [employer] Information     [employer] lets you send messages to your doctor, view your test results, renew your prescriptions, schedule appointments and more. To sign up, go to [email or web address] . Click on \"Log in\" on the left side of the screen, which will take you to the Welcome page. Then click on \"Sign up Now\" on the right side of the page.     You will be asked to enter the access code listed below, as well as some personal information. Please follow the directions to create your username and password.     Your access code is: [email or web address]  Expires: 9/10/2017  2:26 PM     Your access code will  in 90 days. If you need help or a new code, please call your [facility] clinic or [phone].        Care EveryWhere ID     This is your Care EveryWhere ID. This could be used by other organizations to access your [facility] medical records  [id number]        Your Vitals Were     Pulse Temperature Height Last Period Pulse Oximetry Breastfeeding?    68 97.9  F (36.6  C) (Tympanic) 5' 7\" (1.702 m) 2017 98% No    BMI (Body Mass Index)                   28.35 kg/m2            Blood Pressure from Last 3 Encounters:   17 132/89   17 112/70   17 120/75    Weight from Last 3 Encounters:   17 181 lb (82.1 kg)   17 182 lb (82.6 kg)   17 182 lb 9.6 oz (82.8 kg)              We Performed the Following     ADMIN 1st " VACCINE     CBC with platelets     Comprehensive metabolic panel     HPV High Risk Types DNA Cervical     Lipid Profile (Chol, Trig, HDL, LDL calc)     MENTAL HEALTH REFERRAL     Pap imaged thin layer screen with HPV - recommended age 30 - 65 years (select HPV order below)     TDAP VACCINE (ADACEL)     TSH with free T4 reflex     Vitamin B12     Vitamin D Deficiency          Today's Medication Changes          These changes are accurate as of: 7/5/17  9:55 AM.  If you have any questions, ask your nurse or doctor.               Start taking these medicines.        Dose/Directions    escitalopram 10 MG tablet   Commonly known as:  LEXAPRO   Used for:  Depression with anxiety   Started by:  Sarai Alexander MD        Dose:  10 mg   Take 1 tablet (10 mg) by mouth daily   Quantity:  30 tablet   Refills:  1            Where to get your medicines      These [facility] [place] [facility] [address]  [address] [place] [address]     Phone:  [phone]     escitalopram 10 MG tablet                Primary Care Provider Office Phone # Fax #    Sarai Alexander [phone]       [facility] [place] [address]  [place] [address]        Equal Access to Services     [facility]: Hadii aad ku hadasho Soomaali, waaxda luqadaha, qaybta kaalmada adeegyada, waxcaty monzonin hayaan adebaylee stubbs . So [facility] [phone].    ATENCIÓN: Si habla español, tiene a germain disposición servicios gratuitos de asistencia lingüística. Llame al [phone].    We comply with applicable federal civil rights laws and [place] laws. We do not discriminate on the basis of race, color, national origin, age, disability sex, sexual orientation or gender identity.            Thank you!     [facility] you for choosing [facility]  for your care. Our goal is always to provide you with excellent care. Hearing back from our patients is one way we can continue to improve our  services. Please take a few minutes to complete the written survey that you may receive in the mail after your visit with us. Thank you!             Your Updated Medication List - Protect others around you: Learn how to safely use, store and throw away your medicines at www.disposemymeds.org.          This list is accurate as of: 7/5/17  9:55 AM.  Always use your most recent med list.                   Brand Name Dispense Instructions for use Diagnosis    escitalopram 10 MG tablet    LEXAPRO    30 tablet    Take 1 tablet (10 mg) by mouth daily    Depression with anxiety       etodolac 300 MG capsule    LODINE    30 capsule    Take 1 capsule (300 mg) by mouth 3 times daily (with meals)    Left foot pain

## 2017-07-05 NOTE — LETTER
[facility] [place]  [address] [place] [address] [phone]      Randi Gregory  [address] [place] [address] [place] [address]      July 17, 2017      Dear Randi,    I have tried to reach you by telephone and have been unsuccessful. Your recent labs looked generally ok.     Electrolytes, kidney function and liver function was normal.   B12 was normal. Cholesterol looked ok, your LDL (bad cholesterol) goal is < 130, yours was 124.     Vitamin D was very low, consistent with Vit D deficiency.   Recommend high dose replacement therapy, Vit D 50,000 units once a week x 8 weeks   then maintenance OTC Vit D supplementation 1000 units daily.   Will recheck labs in 6 months.   Rx sent to listed preferred pharm.   Labs pended for future (6months).    Please call myself or my nurse for any further questions or concerns at [phone].     Sincerely,    Sarai Alexander MD/amy

## 2017-07-05 NOTE — LETTER
July 25, 2017    Randi Gregory  [address] [place] [address]  [place] [address]    Dear Randi,  We are happy to inform you that your PAP smear result from 7/5/17 is normal.  We are now able to do a follow up test on PAP smears. The DNA test is for HPV (Human Papilloma Virus). Cervical cancer is closely linked with certain types of HPV. Your result showed no evidence of high risk HPV.  Therefore we recommend you return in 5 years for your next pap smear and HPV test.  You will still need to return to the clinic every year for an annual exam and other preventive tests.  Please contact the clinic at [phone] with any questions.  Sincerely,    Sarai Alexander MD/tad

## 2017-07-05 NOTE — NURSING NOTE
"Chief Complaint   Patient presents with     Physical       Initial /89  Pulse 68  Temp 97.9  F (36.6  C) (Tympanic)  Ht 5' 7\" (1.702 m)  Wt 181 lb (82.1 kg)  LMP 06/28/2017  SpO2 98%  Breastfeeding? No  BMI 28.35 kg/m2 Estimated body mass index is 28.35 kg/(m^2) as calculated from the following:    Height as of this encounter: 5' 7\" (1.702 m).    Weight as of this encounter: 181 lb (82.1 kg).  Medication Reconciliation: complete     Screening Questionnaire for Adult Immunization    Are you sick today?   No   Do you have allergies to medications, food, a vaccine component or latex?   No   Have you ever had a serious reaction after receiving a vaccination?   No   Do you have a long-term health problem with heart disease, lung disease, asthma, kidney disease, metabolic disease (e.g. diabetes), anemia, or other blood disorder?   No   Do you have cancer, leukemia, HIV/AIDS, or any other immune system problem?   No   In the past 3 months, have you taken medications that affect  your immune system, such as prednisone, other steroids, or anticancer drugs; drugs for the treatment of rheumatoid arthritis, Crohn s disease, or psoriasis; or have you had radiation treatments?   No   Have you had a seizure, or a brain or other nervous system problem?   No   During the past year, have you received a transfusion of blood or blood     products, or been given immune (gamma) globulin or antiviral drug?   No   For women: Are you pregnant or is there a chance you could become        pregnant during the next month?   No   Have you received any vaccinations in the past 4 weeks?   No     Immunization questionnaire answers were all negative.      MNVFC doesn't apply on this patient    Per orders of Dr. Alexander, injection of TDAP given by Marta Xiao. Patient instructed to remain in clinic for 15 minutes afterwards, and to report any adverse reaction to me immediately.       Screening performed by Marta ELIZONDO" Dameon on 7/5/2017 at 9:15 AM.      Marta Xiao MA

## 2017-07-06 LAB — DEPRECATED CALCIDIOL+CALCIFEROL SERPL-MC: 11 UG/L (ref 20–75)

## 2017-07-06 ASSESSMENT — PATIENT HEALTH QUESTIONNAIRE - PHQ9: SUM OF ALL RESPONSES TO PHQ QUESTIONS 1-9: 20

## 2017-07-06 ASSESSMENT — ANXIETY QUESTIONNAIRES: GAD7 TOTAL SCORE: 13

## 2017-07-07 ENCOUNTER — OFFICE VISIT (OUTPATIENT)
Dept: ORTHOPEDICS | Facility: CLINIC | Age: 42
End: 2017-07-07
Payer: COMMERCIAL

## 2017-07-07 VITALS
WEIGHT: 181 LBS | SYSTOLIC BLOOD PRESSURE: 130 MMHG | BODY MASS INDEX: 28.41 KG/M2 | DIASTOLIC BLOOD PRESSURE: 85 MMHG | HEIGHT: 67 IN

## 2017-07-07 DIAGNOSIS — M77.42 METATARSALGIA OF LEFT FOOT: Primary | ICD-10-CM

## 2017-07-07 DIAGNOSIS — S99.922D FOOT INJURY, LEFT, SUBSEQUENT ENCOUNTER: ICD-10-CM

## 2017-07-07 LAB
COPATH REPORT: NORMAL
PAP: NORMAL

## 2017-07-07 PROCEDURE — 99213 OFFICE O/P EST LOW 20 MIN: CPT | Performed by: FAMILY MEDICINE

## 2017-07-07 NOTE — MR AVS SNAPSHOT
"              After Visit Summary   7/7/2017    Randi Gregory    MRN: [id number]           Patient Information     Date Of Birth          1975        Visit Information        Provider Department      7/7/2017 4:20 PM Mac Martin DO [facility] [place]        Today's Diagnoses     Metatarsalgia of left foot    -  1    Foot injury, left, subsequent encounter           Follow-ups after your visit        Your next 10 appointments already scheduled     Aug 04, 2017  8:30 AM CDT   [facility]ai [facility]    [address]  [place] [address] [phone]              Future tests that were ordered for you today     Open Future Orders        Priority Expected Expires Ordered    MR Foot Left w/o Contrast Routine  7/7/2018 7/7/2017            Who to contact     If you have questions or need follow up information about today's clinic visit or your schedule please contact [facility] [place] directly at [phone].  Normal or non-critical lab and imaging results will be communicated to you by MyChart, letter or phone within 4 business days after the clinic has received the results. If you do not hear from us within 7 days, please contact the clinic through [employer] or phone. If you have a critical or abnormal lab result, we will notify you by phone as soon as possible.  Submit refill requests through [employer] or call your pharmacy and they will forward the refill request to us. Please allow 3 business days for your refill to be completed.          Additional Information About Your Visit        MyChart Information     [employer] lets you send messages to your doctor, view your test results, renew your prescriptions, schedule appointments and more. To sign up, go to [email or web address] . Click on \"Log in\" on the left side of the screen, which will take you to the Welcome page. Then " "click on \"Sign up Now\" on the right side of the page.     You will be asked to enter the access code listed below, as well as some personal information. Please follow the directions to create your username and password.     Your access code is: [email or web address]  Expires: 9/10/2017  2:26 PM     Your access code will  in 90 days. If you need help or a new code, please call your [facility] clinic or [phone].        Care EveryWhere ID     This is your Care EveryWhere ID. This could be used by other organizations to access your [facility] medical records  [id number]        Your Vitals Were     Height Last Period BMI (Body Mass Index)             5' 7\" (1.702 m) 2017 28.35 kg/m2          Blood Pressure from Last 3 Encounters:   17 130/85   17 132/89   17 112/70    Weight from Last 3 Encounters:   17 181 lb (82.1 kg)   17 181 lb (82.1 kg)   17 182 lb (82.6 kg)               Primary Care Provider Office Phone # Fax #    Sarai Alexander [phone]       [facility] [place] [address]  [place] [address]        Equal Access to Services     [facility]: Hadii aad ku hadasho Soomaali, waaxda luqadaha, qaybta kaalmada adeegyada, jon greer haymilan stubbs . So [facility] [phone].    ATENCIÓN: Si habla español, tiene a germain disposición servicios gratuitos de asistencia lingüística. Llame al [phone].    We comply with applicable federal civil rights laws and [place] laws. We do not discriminate on the basis of race, color, national origin, age, disability sex, sexual orientation or gender identity.            Thank you!     Thank you for choosing [facility]  for your care. Our goal is always to provide you with excellent care. Hearing back from our patients is one way we can continue to improve our services. Please take a few minutes to complete the written survey that you may receive in the mail after " your visit with us. Thank you!             Your Updated Medication List - Protect others around you: Learn how to safely use, store and throw away your medicines at www.disposemymeds.org.          This list is accurate as of: 7/7/17  4:42 PM.  Always use your most recent med list.                   Brand Name Dispense Instructions for use Diagnosis    escitalopram 10 MG tablet    LEXAPRO    30 tablet    Take 1 tablet (10 mg) by mouth daily    Depression with anxiety       etodolac 300 MG capsule    LODINE    30 capsule    Take 1 capsule (300 mg) by mouth 3 times daily (with meals)    Left foot pain

## 2017-07-07 NOTE — PROGRESS NOTES
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  Pain located over left 2nd and 3rd metatrasal, nonradiating.  Additional Features:  Positive: swelling, walks with antalgic gait.  Symptoms are better with Other medications: etodolac, Rest and Elevation.  Symptoms are worse with: walking.  Other evaluation and/or treatments so far consists of: Ice, Other medications: etodolac, Elevation and epsom salt bath.  Prior imaging: X-rays completed 17.   Prior History of related problems:none    Social History: [employer] , customer service    Interim History - 2017  Since last visit on 2017 patient has mild-moderate left foot pain.  She has had intermittent swelling depending on her activity.  Notes she mild improvement after resting for one week d/t viral illness.  No interim injury.         Review of Systems  Musculoskeletal: as above  Remainder of review of systems is negative including constitutional, CV, pulmonary, GI, Skin and Neurologic except as noted in HPI or medical history.    Past Medical History:   Diagnosis Date     NO ACTIVE PROBLEMS      Past Surgical History:   Procedure Laterality Date     NO HISTORY OF SURGERY       Objective  /85  Ht 5' 7\" (1.702 m)  Wt 181 lb (82.1 kg)  LMP 2017  BMI 28.35 kg/m2    General: healthy, alert and in no distress    HEENT: no scleral icterus or conjunctival erythema   Skin: no suspicious lesions or rash. No jaundice.   CV: regular rhythm by palpation, 2+ distal pulses, no pedal edema    Resp: normal respiratory effort without conversational dyspnea   Psych: normal mood and affect    Gait: mildly antalgic, appropriate " coordination and balance   Neuro: normal light touch sensory exam of the extremities. Motor strength as noted below     Left Ankle/Foot Exam:    Inspection:       no visible ecchymosis       no visible effusion, mild edema and very mild erythema without warmth or hypersensitivity to touch    Foot inspection:       no deformity noted    ROM:        full ROM with dorsiflexion, plantarflexion, inversion and eversion    Tender:       proximal 1st and 2nd intermetatarsal ligament       2nd and 3rd MT    Non-Tender:       remainder of foot and ankle       lateral malleolus       lateral ankle ligaments       deltoid ligament       posterior edge of lateral malleolus       proximal 5th metatarsal       dorsal tibiotalar joint       tarsal navicular       medial malleolus       distal tibiofibular joint       tibialis posterior tendon, posterior to medial malleolus       peroneal tendon sheath    Skin:       well perfused       capillary refill less than 2 seconds    Special Tests:       neg (-) talar tilt left    Gait:       increased pain with attempt at rising on toes    Proprioception:        deferred      Radiology:  Recent Results (from the past 744 hour(s))   XR Foot Left G/E 3 Views    Narrative    XR FOOT LT G/E 3 VW 6/20/2017 4:21 PM    HISTORY: Left foot pain.    COMPARISON: None.    FINDINGS: No fracture or malalignment. No significant degenerative  change. Osseous structures are within normal limits.      Impression    IMPRESSION: No specific finding to explain pain.    STEFAN GARCÍA MD         Assessment:  1. Metatarsalgia of left foot    2. Foot injury, left, subsequent encounter        Plan:  Discussed the assessment with the patient.  Follow up: will call with MR results  Overuse type injury suspected  Somewhat but not significantly better in walking boot  Will continue to treat for possible stress injury though no acute findings on XR  XR images independently visualized and reviewed with patient again  today in clinic  MR will more clearly define any structural issue  Letter updated today:  Assistive device use reviewed  Prefer tylenol to advil if stress injury  We discussed modified progressive pain-free activity as tolerated  Home handouts provided and supportive care reviewed  All questions were answered today  Contact us with additional questions or concerns  Signs and sx of concern reviewed      Mac Martin DO, CAJOHANA  Primary Care Sports Medicine  [facility]

## 2017-07-07 NOTE — NURSING NOTE
"Chief Complaint   Patient presents with     Musculoskeletal Problem     f/u left foot pain       Initial /85  Ht 5' 7\" (1.702 m)  Wt 181 lb (82.1 kg)  LMP 06/28/2017  BMI 28.35 kg/m2 Estimated body mass index is 28.35 kg/(m^2) as calculated from the following:    Height as of this encounter: 5' 7\" (1.702 m).    Weight as of this encounter: 181 lb (82.1 kg).  Medication Reconciliation: complete     Abiel Alonso ATC  "

## 2017-07-07 NOTE — LETTER
July 7, 2017      Randi Gregory  [address] [place] [address]  [place] [address]        Randi was seen in my office today.  I am recommending that she strictly avoid painful activity over the next 4 weeks.  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  She may be using a walking boot and/or cane during that time.  Please help to accommodate.  Updated recommendations will be provided as needed.          Mac Freire DO, CAQ  Primary Care Sports Medicine  [facility]

## 2017-07-11 DIAGNOSIS — E55.9 VITAMIN D DEFICIENCY: Primary | ICD-10-CM

## 2017-07-11 LAB
FINAL DIAGNOSIS: NORMAL
HPV HR 12 DNA CVX QL NAA+PROBE: NEGATIVE
HPV16 DNA SPEC QL NAA+PROBE: NEGATIVE
HPV18 DNA SPEC QL NAA+PROBE: NEGATIVE
SPECIMEN DESCRIPTION: NORMAL

## 2017-07-13 ENCOUNTER — RADIANT APPOINTMENT (OUTPATIENT)
Dept: MRI IMAGING | Facility: CLINIC | Age: 42
End: 2017-07-13
Attending: FAMILY MEDICINE
Payer: COMMERCIAL

## 2017-07-13 DIAGNOSIS — S99.922D FOOT INJURY, LEFT, SUBSEQUENT ENCOUNTER: ICD-10-CM

## 2017-07-13 DIAGNOSIS — M77.42 METATARSALGIA OF LEFT FOOT: ICD-10-CM

## 2017-07-13 PROCEDURE — 73718 MRI LOWER EXTREMITY W/O DYE: CPT | Mod: TC

## 2017-07-14 ENCOUNTER — TELEPHONE (OUTPATIENT)
Dept: ORTHOPEDICS | Facility: CLINIC | Age: 42
End: 2017-07-14

## 2017-07-14 NOTE — TELEPHONE ENCOUNTER
Called and left brief VM message.  Her MR does show a stress fracture in her foot.  Continue to use the boot, and offload the foot as needed to avoid pain.  F/u as directed in 2 weeks.  Happy to discuss further by phone if needed, but message can be relayed if she calls back.

## 2019-10-05 NOTE — TELEPHONE ENCOUNTER
Left message on voice mail for patient to call clinic. [phone]    
Left message on voice mail for patient to call clinic. [phone]  Chuyita Sanchez RN    
Left message on voice mail for patient to call clinic. [phone]  Chuyita Sanchez RN    
Letter placed at . Will await return call from patient  
Noted.   Letter confirming her appt on 6/20 completed.   How is her foot? Still having pain?  Regarding proper restrictions for work- recommend further evaluation with  Podiatry to determine underlying etiology and length of restrictions etc.   
Patient calling to return call.       
Patient notified and voiced understanding and agreement.  Has picked up the letter already has appt upstairs for foot follow up. But foot is getting better.  Has AFE on 6/28/17 with Dr. Alexander.  Chuyita Sanchez RN    
Spoke with patient and below information given, she will think about the podiatry referral, she will follow the home care instructions, but she will need to  a note for work that she had an appt for her foot.  She also asked if she needed any restrictions beyond the meds and RICE    Chuyita Sanchez RN        Notes Recorded by Sarai Alexander MD on 6/22/2017 at 1:05 PM  Please call and let patient know that the foot X ray was reported normal.     If foot pain persists, will be happy to refer to Podiatry.   
14350 - Disposition Day Code